# Patient Record
(demographics unavailable — no encounter records)

---

## 2025-05-05 NOTE — DISCUSSION/SUMMARY
[de-identified] : X-rays reviewed, posttraumatic arthritis seen.  Continue with nonsurgical management for his arthritis.  Continue with an over-the-counter ankle brace versus a sleeve.  Activity modifications.  RICE therapy for swelling control.  No invasive treatments are warranted at this time.  Patient does not want surgery which I agree with at this time.  All questions answered.  Follow-up as needed if the pain continues.  Patient understood and agreed to the treatment course.

## 2025-05-05 NOTE — HISTORY OF PRESENT ILLNESS
[FreeTextEntry1] : 05/05/2025: ANNE CAMPUZANO is a 78 year old male presenting to the office for an initial evaluation of left ankle pain. He reports that the pain is acute on chronic pain.  He states that he injured his ankle about 6 to 7 years ago, possibly broke it.  He has increased pain and swelling with standing on his feet for long periods of time and with physical activity.  He has no pain at rest.  He is here to have his ankle evaluated.  He does have swelling present today.  He is in regular sneakers, walking without assistance.  He does have over-the-counter braces at home.  No other complaints.

## 2025-05-05 NOTE — PHYSICAL EXAM
[de-identified] : left ankle Physical Examination:   General: Alert and oriented x3.  In no acute distress.  Pleasant in nature with a normal affect.  No apparent respiratory distress. Erythema, Warmth, Rubor: Negative Swelling: Positive mild to moderate.   ROM: 1. Dorsiflexion: 10 degrees 2. Plantarflexion: 40 degrees 3. 10 degrees of subtalar motion 4. Inversion: 20 degrees 5. Eversion: 20 degrees   Tenderness to Palpation: 1. Lateral Malleolus: Negative 2. Medial Malleolus: Negative 3. Proximal Fibular Pain: Negative 4. Heel Pain: Negative 5. Cuboid: Negative 6. Navicular: Negative 7. Tibiotalar Joint: Positive 8. Subtalar Joint: Negative 9. Posterior Recess: Negative   Tendon Pain: 1. Achilles: Negative 2. Peroneals: Negative 3. Posterior Tibialis: Negative 4. Tibialis Anterior: Negative   Ligament Pain: 1. ATFL: Negative 2. CFL: Negative 3. PTFL: Negative 4. Deltoid Ligaments: Positive 5. Lis Franc Ligament: Negative   Stability: 1. Anterior Drawer: Negative 2. Posterior Drawer: Negative   Strength: 5/5 TA/GS/EHL   Pulses: 2+ DP/PT Pulses   Neuro: Intact motor and sensory   Additional Test: 1. Calcaneal Squeeze Test: Negative 2. Syndesmosis Squeeze Test: Negative 3. Harris Test: Negative  [de-identified] : 3V of the left ankle were ordered, obtained and reviewed by me today, 05/05/2025, and revealed: Large bone fragment seen coming off the medial malleolus consistent with an old avulsion injury/fracture/ligament injury.  Posttraumatic arthritis seen tibiotalar joint with tibiotalar tilt.